# Patient Record
Sex: MALE
[De-identification: names, ages, dates, MRNs, and addresses within clinical notes are randomized per-mention and may not be internally consistent; named-entity substitution may affect disease eponyms.]

---

## 2018-10-05 ENCOUNTER — HOSPITAL ENCOUNTER (EMERGENCY)
Dept: HOSPITAL 14 - H.ER | Age: 33
Discharge: HOME | End: 2018-10-05
Payer: COMMERCIAL

## 2018-10-05 VITALS
DIASTOLIC BLOOD PRESSURE: 60 MMHG | OXYGEN SATURATION: 99 % | TEMPERATURE: 98 F | RESPIRATION RATE: 18 BRPM | HEART RATE: 70 BPM | SYSTOLIC BLOOD PRESSURE: 122 MMHG

## 2018-10-05 VITALS — BODY MASS INDEX: 28.7 KG/M2
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
## 2018-10-05 DIAGNOSIS — S90.31XA: Primary | ICD-10-CM

## 2018-10-05 NOTE — CP.PCM.CON
History of Present Illness





- History of Present Illness


History of Present Illness: 





Consult notes for attending Miranda Krueger:





35 y/o F patient with no PMH Seen and evaluated in the Ed for pain and swelling 

in his right foot Patient is AAO X 3. Patient states that was playing football 

yesterday and landed strongly on his right heel. Patient states that he didn't 

feel the pain immediately but later on he has pain when walking on his right 

heel . Patient states that the pain is 1/10 off weight bearing and 8/10 on we

ight bearing. Patient denies any other pedal complaint at this time. She denies 

any tingling, numbness or burning sensation in her feet. She denies any recent 

F/N/V/C or SOB.





PMH: None


PSH: None


Allergies: Bacitracin, Neomycin, Polymyxin B


Social Hx: Denies smoking, ETOH use or illicit drug use














Review of Systems





- Review of Systems


Review of Systems: 





As per HPI





Past Patient History





- Past Social History


Alcohol: Social


Drugs: Denies





- PSYCHIATRIC


Hx Substance Use: No





- SURGICAL HISTORY


Hx Surgeries: No





Meds


Home Medications: 


                              Home Medication List











 Medication  Instructions  Recorded  Confirmed  Type


 


Naproxen [Naprosyn] 500 mg PO BID PRN #20 tablet 10/05/18  Rx











Allergies/Adverse Reactions: 


                                    Allergies











Allergy/AdvReac Type Severity Reaction Status Date / Time


 


bacitracin Allergy  REDNESS Verified 10/05/18 07:51





[From Neosporin     





([id number])]     


 


neomycin Allergy  REDNESS Verified 10/05/18 07:51





[From Neosporin     





([id number])]     


 


polymyxin B Allergy  REDNESS Verified 10/05/18 07:51





[From Neosporin     





([id number])]     














Physical Exam





- Constitutional


Appears: Well, Non-toxic, No Acute Distress





- Head Exam


Head Exam: ATRAUMATIC, NORMOCEPHALIC





- Extremities Exam


Additional comments: 





LE focused exam:





Vasc: DP/PT 2/4 b/l. Cap refill < 3 sec in all digits. Temp gradient warm to 

cool b/l from proximal to distal. minimal non pitting edema noted at the medial;

side of the right heel. 





Neuro: Gross and protective sensations are intact





Derm:No open lesions. No clinical signs of infection.





MSK: Muscle power intact 5/5 in all groups b/l. Patient can perform passive and 

active ROM b/l. Pain on palpating the medial side of the right heel

















- Neurological Exam


Neurological exam: Alert, Oriented x3





- Psychiatric Exam


Psychiatric exam: Normal Affect, Normal Mood





Results





- Vital Signs


Recent Vital Signs: 


                                Last Vital Signs











Temp  98 F   10/05/18 12:48


 


Pulse  70   10/05/18 12:48


 


Resp  18   10/05/18 12:48


 


BP  122/60   10/05/18 12:48


 


Pulse Ox  99   10/05/18 12:48














Assessment & Plan





- Assessment and Plan (Free Text)


Assessment: 








34 y/o M patient seen and evaluated in the ED for Pain in his right heel











Plan: 





Patient seen and evaluated in the Ed.


Plan discussed with attending Miranda Krueger


Chart and vitals reviewed; Afebrile.


X-ray right foot reviewed: no fractures


X-ray right ankle reviewed; No fractures


Modified Reyna compression applied to the right LE


Patient educated RICE protocol


Patient to Bear weight as tolerated to the right forefoot.


Patient instructed to use OTC NSAID for pain and inflammation


Patient expressed verbal understanding


Patient will follow up in Miranda Krueger in his office.





- Date & Time


Date: 10/05/18


Time: 13:37

## 2018-10-05 NOTE — ED PDOC
Lower Extremity Pain/Injury


Time Seen by Provider: 10/05/18 07:45


Chief Complaint (Nursing): Lower Extremity Problem/Injury


Chief Complaint (Provider): Lower Extremity Problem/Injury


History Per: Patient


History/Exam Limitations: no limitations


Onset/Duration Of Symptoms: Days (1)


Additional Complaint(s): 


33 years old male presents to ER for evaluation of right foot pain after an 

injury while playing football yesterday. Patient states he was flying to get the

ball, landed to ground and has been unable to put pressure on the foot since 

then. He reports some tingling and pain on extending the foot. Patient denies 

any snapping or popping sound of the foot. 





PMD: non provided





Past Medical History


Reviewed: Historical Data, Nursing Documentation, Vital Signs


Vital Signs: 





                                Last Vital Signs











Temp  97.6 F   10/05/18 07:40


 


Pulse  61   10/05/18 07:40


 


Resp  16   10/05/18 07:40


 


BP  133/82   10/05/18 07:40


 


Pulse Ox  98   10/05/18 07:40














- Medical History


PMH: No Chronic Diseases





- Surgical History


Surgical History: No Surg Hx





- Family History


Family History: States: Unknown Family Hx





- Social History


Current smoker - smoking cessation education provided: No


Alcohol: Social


Drugs: Denies





- Home Medications


Home Medications: 


                                Ambulatory Orders











 Medication  Instructions  Recorded


 


No Known Home Med  10/05/18














- Allergies


Allergies/Adverse Reactions: 


                                    Allergies











Allergy/AdvReac Type Severity Reaction Status Date / Time


 


bacitracin Allergy  REDNESS Verified 10/05/18 07:51





[From Neosporin     





([id number])]     


 


neomycin Allergy  REDNESS Verified 10/05/18 07:51





[From Neosporin     





([id number])]     


 


polymyxin B Allergy  REDNESS Verified 10/05/18 07:51





[From Neosporin     





([id number])]     














Review of Systems


ROS Statement: Except As Marked, All Systems Reviewed And Found Negative


Musculoskeletal: Positive for: Foot Pain (Right)





Physical Exam





- Reviewed


Nursing Documentation Reviewed: Yes


Vital Signs Reviewed: Yes





- Physical Exam


Appears: Positive for: Non-toxic, No Acute Distress


Head Exam: Positive for: ATRAUMATIC, NORMOCEPHALIC


Extremity: Positive for: Normal ROM, Tenderness (to right calcanues. No 

tenderness to right metatarsals.).  Negative for: Swelling (of right foot), 

Other (Ecchymosis of right ankle and foot)


Neurologic/Psych: Positive for: Alert, Oriented (x3)





- ECG


O2 Sat by Pulse Oximetry: 98 (RA)


Pulse Ox Interpretation: Normal





Medical Decision Making


Medical Decision Making: 


Time: 0820





Initial Plan:


--Right Ankle X-Ray


--Right Foot X-Ray





 

--------------------------------------------------------------------------------


-----


Scribe Attestation:


Documented by Yeimy Conklin, acting as a scribe for Jackelyn Severino MD.





Provider Scribe Attestation:


All medical record entries made by the Scribe were at my direction and 

personally dictated by me. I have reviewed the chart and agree that the record 

accurately reflects my personal performance of the history, physical exam, 

medical decision making, and the department course for this patient. I have also

personally directed, reviewed, and agree with the discharge instructions and 

disposition.





Disposition





- Clinical Impression


Clinical Impression: 


 Contusion of heel








- Patient ED Disposition


Is Patient to be Admitted: No


Doctor Will See Patient In The: Office


Counseled Patient/Family Regarding: Diagnosis, Need For Followup, Rx Given





- Disposition


Referrals: 


Jalen Krueger MD [Staff Provider] - 


Disposition: Routine/Home


Disposition Time: 12:00


Condition: STABLE


Instructions:  Contusion (DC)


Forms:  [facility] (English)





- POA


Present On Arrival: Falls Or Trauma

## 2018-10-05 NOTE — RAD
Date of service: 



10/05/2018



PROCEDURE:  Right Ankle Radiographs.



HISTORY:

 blunt injury from landing on heel 



COMPARISON:

None



FINDINGS:



BONES:

Normal. No fracture. 



JOINTS:

Normal. No osteoarthritis. Ankle mortise maintained. Talar dome intact



SOFT TISSUES:

Normal. 



OTHER FINDINGS:

None.



IMPRESSION:

Normal right ankle radiographs.

## 2018-10-05 NOTE — RAD
Date of service: 



10/05/2018



PROCEDURE:  Right Foot Radiographs.



HISTORY:

 blunt injury from landing on heel 



COMPARISON:

None.



FINDINGS:



BONES:

Normal. No fracture. 



JOINTS:

Normal. 



SOFT TISSUES:

Normal. 



OTHER FINDINGS:

None.



IMPRESSION:

Normal right foot radiographs.